# Patient Record
Sex: FEMALE | Race: WHITE | NOT HISPANIC OR LATINO | Employment: OTHER | URBAN - METROPOLITAN AREA
[De-identification: names, ages, dates, MRNs, and addresses within clinical notes are randomized per-mention and may not be internally consistent; named-entity substitution may affect disease eponyms.]

---

## 2017-02-17 NOTE — PATIENT DISCUSSION
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 - Plan : Monitor for changes. Advised patient to call our office with decreased vision or increased symptoms.

## 2017-02-22 NOTE — PATIENT DISCUSSION
(H40.053) Ocular hypertension, bilateral - Assesment : Examination reveals Intermittent Ocular Hypertension OU. OCT and IOP Stable OU. - Plan : Patient OK to continue off of glaucoma drops. Irritation should subside as eyes recover from drop usage. RV next time patient is in town or in the Fall for Complete Exam and OCT ONH.

## 2020-11-19 ENCOUNTER — NEW PATIENT (OUTPATIENT)
Dept: URBAN - METROPOLITAN AREA CLINIC 26 | Facility: CLINIC | Age: 65
End: 2020-11-19

## 2020-11-19 VITALS
WEIGHT: 140 LBS | HEART RATE: 69 BPM | DIASTOLIC BLOOD PRESSURE: 81 MMHG | SYSTOLIC BLOOD PRESSURE: 110 MMHG | BODY MASS INDEX: 25.76 KG/M2 | HEIGHT: 62 IN

## 2020-11-19 DIAGNOSIS — H35.373: ICD-10-CM

## 2020-11-19 DIAGNOSIS — H35.073: ICD-10-CM

## 2020-11-19 DIAGNOSIS — H25.9: ICD-10-CM

## 2020-11-19 PROCEDURE — 92235 FLUORESCEIN ANGRPH MLTIFRAME: CPT

## 2020-11-19 PROCEDURE — 92134 CPTRZ OPH DX IMG PST SGM RTA: CPT

## 2020-11-19 PROCEDURE — 92004 COMPRE OPH EXAM NEW PT 1/>: CPT

## 2020-11-19 PROCEDURE — 92250 FUNDUS PHOTOGRAPHY W/I&R: CPT

## 2020-11-19 ASSESSMENT — VISUAL ACUITY
OS_SC: 20/20-2
OD_SC: 20/60
OD_PH: 20/25

## 2020-11-19 ASSESSMENT — TONOMETRY
OD_IOP_MMHG: 13
OS_IOP_MMHG: 12

## 2021-11-29 ENCOUNTER — FOLLOW UP (OUTPATIENT)
Dept: URBAN - METROPOLITAN AREA CLINIC 26 | Facility: CLINIC | Age: 66
End: 2021-11-29

## 2021-11-29 VITALS — HEIGHT: 62 IN | WEIGHT: 135 LBS | BODY MASS INDEX: 24.84 KG/M2

## 2021-11-29 DIAGNOSIS — H25.9: ICD-10-CM

## 2021-11-29 DIAGNOSIS — H02.833: ICD-10-CM

## 2021-11-29 DIAGNOSIS — H04.123: ICD-10-CM

## 2021-11-29 DIAGNOSIS — H02.836: ICD-10-CM

## 2021-11-29 DIAGNOSIS — H35.073: ICD-10-CM

## 2021-11-29 DIAGNOSIS — H35.373: ICD-10-CM

## 2021-11-29 PROCEDURE — 92014 COMPRE OPH EXAM EST PT 1/>: CPT

## 2021-11-29 PROCEDURE — 92250 FUNDUS PHOTOGRAPHY W/I&R: CPT

## 2021-11-29 PROCEDURE — 92134 CPTRZ OPH DX IMG PST SGM RTA: CPT

## 2021-11-29 ASSESSMENT — VISUAL ACUITY
OS_SC: 20/40
OD_CC: 20/50-2
OS_CC: 20/30-2
OD_SC: 20/40-1

## 2021-11-29 ASSESSMENT — TONOMETRY
OS_IOP_MMHG: 10
OD_IOP_MMHG: 10

## 2023-09-13 ENCOUNTER — FOLLOW UP (OUTPATIENT)
Dept: URBAN - METROPOLITAN AREA CLINIC 26 | Facility: CLINIC | Age: 68
End: 2023-09-13

## 2023-09-13 DIAGNOSIS — H04.123: ICD-10-CM

## 2023-09-13 DIAGNOSIS — H35.073: ICD-10-CM

## 2023-09-13 DIAGNOSIS — H57.13: ICD-10-CM

## 2023-09-13 DIAGNOSIS — H35.373: ICD-10-CM

## 2023-09-13 DIAGNOSIS — H35.81: ICD-10-CM

## 2023-09-13 PROCEDURE — 92014 COMPRE OPH EXAM EST PT 1/>: CPT

## 2023-09-13 PROCEDURE — 92134 CPTRZ OPH DX IMG PST SGM RTA: CPT

## 2023-09-13 ASSESSMENT — VISUAL ACUITY
OD_PH: 20/40-2
OS_CC: 20/40
OD_CC: 20/50-1

## 2023-09-13 ASSESSMENT — TONOMETRY
OS_IOP_MMHG: 13
OD_IOP_MMHG: 12

## 2024-05-13 ENCOUNTER — COMPREHENSIVE EXAM (OUTPATIENT)
Dept: URBAN - METROPOLITAN AREA CLINIC 26 | Facility: CLINIC | Age: 69
End: 2024-05-13

## 2024-05-13 VITALS — BODY MASS INDEX: 25.76 KG/M2 | WEIGHT: 140 LBS | HEIGHT: 62 IN

## 2024-05-13 DIAGNOSIS — H25.9: ICD-10-CM

## 2024-05-13 DIAGNOSIS — H57.13: ICD-10-CM

## 2024-05-13 DIAGNOSIS — H35.073: ICD-10-CM

## 2024-05-13 DIAGNOSIS — H04.123: ICD-10-CM

## 2024-05-13 DIAGNOSIS — H35.373: ICD-10-CM

## 2024-05-13 DIAGNOSIS — H02.833: ICD-10-CM

## 2024-05-13 DIAGNOSIS — H35.81: ICD-10-CM

## 2024-05-13 DIAGNOSIS — H02.836: ICD-10-CM

## 2024-05-13 PROCEDURE — 92134 CPTRZ OPH DX IMG PST SGM RTA: CPT

## 2024-05-13 PROCEDURE — 92014 COMPRE OPH EXAM EST PT 1/>: CPT

## 2024-05-13 PROCEDURE — 92250 FUNDUS PHOTOGRAPHY W/I&R: CPT | Mod: 59

## 2024-05-13 ASSESSMENT — VISUAL ACUITY
OD_CC: 20/30-2
OS_CC: 20/25-2

## 2024-05-13 ASSESSMENT — TONOMETRY
OS_IOP_MMHG: 11
OD_IOP_MMHG: 11

## 2025-05-13 ENCOUNTER — COMPREHENSIVE EXAM (OUTPATIENT)
Age: 70
End: 2025-05-13

## 2025-05-13 VITALS
HEART RATE: 66 BPM | SYSTOLIC BLOOD PRESSURE: 142 MMHG | HEIGHT: 62 IN | DIASTOLIC BLOOD PRESSURE: 82 MMHG | WEIGHT: 130 LBS | BODY MASS INDEX: 23.92 KG/M2

## 2025-05-13 DIAGNOSIS — H35.073: ICD-10-CM

## 2025-05-13 DIAGNOSIS — H02.833: ICD-10-CM

## 2025-05-13 DIAGNOSIS — H04.123: ICD-10-CM

## 2025-05-13 DIAGNOSIS — H57.13: ICD-10-CM

## 2025-05-13 DIAGNOSIS — H35.373: ICD-10-CM

## 2025-05-13 DIAGNOSIS — H02.836: ICD-10-CM

## 2025-05-13 DIAGNOSIS — H35.343: ICD-10-CM

## 2025-05-13 DIAGNOSIS — H25.9: ICD-10-CM

## 2025-05-13 DIAGNOSIS — H35.81: ICD-10-CM

## 2025-05-13 PROCEDURE — 92014 COMPRE OPH EXAM EST PT 1/>: CPT

## 2025-05-13 PROCEDURE — 92250 FUNDUS PHOTOGRAPHY W/I&R: CPT | Mod: 59

## 2025-05-13 PROCEDURE — 92134 CPTRZ OPH DX IMG PST SGM RTA: CPT
